# Patient Record
Sex: MALE | Race: BLACK OR AFRICAN AMERICAN | Employment: FULL TIME | URBAN - METROPOLITAN AREA
[De-identification: names, ages, dates, MRNs, and addresses within clinical notes are randomized per-mention and may not be internally consistent; named-entity substitution may affect disease eponyms.]

---

## 2021-10-18 ENCOUNTER — OFFICE VISIT (OUTPATIENT)
Dept: SURGERY | Facility: CLINIC | Age: 31
End: 2021-10-18
Payer: COMMERCIAL

## 2021-10-18 DIAGNOSIS — S01.302S OPEN WOUND OF LEFT EAR, UNSPECIFIED OPEN WOUND TYPE, SEQUELA: ICD-10-CM

## 2021-10-18 DIAGNOSIS — L91.0 KELOID: Primary | ICD-10-CM

## 2021-10-18 DIAGNOSIS — S01.301S OPEN WOUND OF RIGHT EAR, UNSPECIFIED OPEN WOUND TYPE, SEQUELA: ICD-10-CM

## 2021-10-18 DIAGNOSIS — S01.301S OPEN WOUND OF RIGHT EAR, UNSPECIFIED OPEN WOUND TYPE, SEQUELA: Primary | ICD-10-CM

## 2021-10-18 PROCEDURE — 99204 OFFICE O/P NEW MOD 45 MIN: CPT | Performed by: PLASTIC SURGERY

## 2021-10-18 RX ORDER — HYDROCODONE BITARTRATE AND ACETAMINOPHEN 7.5; 325 MG/1; MG/1
TABLET ORAL
Qty: 20 TABLET | Refills: 0 | Status: SHIPPED | OUTPATIENT
Start: 2021-10-18 | End: 2021-11-23

## 2021-10-18 NOTE — H&P (VIEW-ONLY)
Kari Boyer is a 28year old male that presents with Patient presents with:  Scar: Bilateral ear keloids  . REFERRED BY:  No ref.  provider found    Pacemaker: No  Latex Allergy: no  Coumadin: No  Plavix: No  Other anticoagulants: No  Cardiac stents: appearance - Normal  HEENT: Normocephalic  EYES: Conjunctiva - Right: Normal, Left: Normal; EOMI  EARS: Inspection - Right: Normal, Left: Normal  NECK/THYROID: Inspection - Normal, Palpation - Normal, Thyroid gland - Normal, No adenopathy  RESPIRATORY: Ins morbidity)    o    Major surgery with risk factors                  MDM LEVEL    MODERATE

## 2021-10-18 NOTE — PROGRESS NOTES
Patient request for surgery signed by patient and witnessed and signed by RN. Prescription for Norco  and narcotic prescription electronically sent to pharmacy per Dr. Vazquez Double order and patient instructed to  prescription before surgery.    Pre

## 2021-10-18 NOTE — H&P
Monica Jade is a 28year old male that presents with Patient presents with:  Scar: Bilateral ear keloids  . REFERRED BY:  No ref.  provider found    Pacemaker: No  Latex Allergy: no  Coumadin: No  Plavix: No  Other anticoagulants: No  Cardiac stents: appearance - Normal  HEENT: Normocephalic  EYES: Conjunctiva - Right: Normal, Left: Normal; EOMI  EARS: Inspection - Right: Normal, Left: Normal  NECK/THYROID: Inspection - Normal, Palpation - Normal, Thyroid gland - Normal, No adenopathy  RESPIRATORY: Ins morbidity)    o    Major surgery with risk factors                  MDM LEVEL    MODERATE

## 2021-10-19 ENCOUNTER — TELEPHONE (OUTPATIENT)
Dept: RADIATION ONCOLOGY | Facility: HOSPITAL | Age: 31
End: 2021-10-19

## 2021-10-21 ENCOUNTER — TELEPHONE (OUTPATIENT)
Dept: RADIATION ONCOLOGY | Facility: HOSPITAL | Age: 31
End: 2021-10-21

## 2021-10-28 DIAGNOSIS — L91.0 KELOID: Primary | ICD-10-CM

## 2021-10-28 DIAGNOSIS — S01.301S OPEN WOUND OF RIGHT EAR, UNSPECIFIED OPEN WOUND TYPE, SEQUELA: ICD-10-CM

## 2021-10-28 DIAGNOSIS — S01.302S OPEN WOUND OF LEFT EAR, UNSPECIFIED OPEN WOUND TYPE, SEQUELA: ICD-10-CM

## 2021-11-02 ENCOUNTER — OFFICE VISIT (OUTPATIENT)
Dept: RADIATION ONCOLOGY | Facility: HOSPITAL | Age: 31
End: 2021-11-02
Attending: RADIOLOGY
Payer: COMMERCIAL

## 2021-11-02 VITALS
OXYGEN SATURATION: 94 % | RESPIRATION RATE: 18 BRPM | WEIGHT: 152 LBS | DIASTOLIC BLOOD PRESSURE: 66 MMHG | SYSTOLIC BLOOD PRESSURE: 135 MMHG | HEART RATE: 56 BPM | TEMPERATURE: 98 F

## 2021-11-02 PROCEDURE — 99212 OFFICE O/P EST SF 10 MIN: CPT

## 2021-11-02 NOTE — PATIENT INSTRUCTIONS
You will get a call to schedule your CT simulation \"planning scan\" on 11/9/2021 following surgery. Call [phone] for any questions or concerns.

## 2021-11-02 NOTE — CONSULTS
RADIATION ONCOLOGY NOTE    DATE OF VISIT: 11/2/2021    DIAGNOSIS :  Symptomatic bilateral ear keloids, for excision shortly,     Dear Torin Rainey and colleagues,    Per our conversation, Mr. 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     HEENT:  PERRLA, EOMI,     Bilateral ear keloids, photos taken,     Right 3 x 3 x 2.5 R anterior ear, 5 x 3 x 3 R posterior ear  Left- 1cm x 1.5 cm posterior 1-1/2 cm     NECK:  Supp

## 2021-11-02 NOTE — PROGRESS NOTES
Nursing Consultation Note  Patient: Washington Bearden  YOB: 1990  Age: 28year old  Radiation Oncologist: Dr. Sonu Morgan  Referring Physician: Michelle Reed@[employer]  Consult Date: 11/2/2021      Chemotherapy: No  Labs: education level: Not on file    Occupational History      Not on file    Tobacco Use      Smoking status: Never Smoker      Smokeless tobacco: Not on file    Substance and Sexual Activity      Alcohol use: Never      Drug use: Not on file      Sexual activ Last Year: Not on file      Number of Places Lived in the Last Year: Not on file      Unstable Housing in the Last Year: Not on file    ECOG:  Grade 0 - Fully active, able to carry on all predisease activities without restrictions.       Education:  Yes are Dr. Graciela Lennon, Dr. Seferino Sherwood and Dr. Cory Tyson. VSS. PMH, allergies, and current medications reviewed with patient. Pt rates his pain a 0/10 today. Norco prescribed, pt hasn't picked up from pharmacy yet.  Pt scheduled for surgery on 11/9 for keloid removal. Pt

## 2021-11-09 ENCOUNTER — HOSPITAL DOCUMENTATION (OUTPATIENT)
Dept: SURGERY | Facility: CLINIC | Age: 31
End: 2021-11-09

## 2021-11-09 ENCOUNTER — ANESTHESIA EVENT (OUTPATIENT)
Dept: SURGERY | Facility: HOSPITAL | Age: 31
End: 2021-11-09
Payer: COMMERCIAL

## 2021-11-09 ENCOUNTER — ANESTHESIA (OUTPATIENT)
Dept: SURGERY | Facility: HOSPITAL | Age: 31
End: 2021-11-09
Payer: COMMERCIAL

## 2021-11-09 ENCOUNTER — HOSPITAL ENCOUNTER (OUTPATIENT)
Facility: HOSPITAL | Age: 31
Setting detail: HOSPITAL OUTPATIENT SURGERY
Discharge: HOME | End: 2021-11-09
Attending: PLASTIC SURGERY | Admitting: PLASTIC SURGERY
Payer: COMMERCIAL

## 2021-11-09 ENCOUNTER — APPOINTMENT (OUTPATIENT)
Dept: RADIATION ONCOLOGY | Facility: HOSPITAL | Age: 31
End: 2021-11-09
Attending: RADIOLOGY
Payer: COMMERCIAL

## 2021-11-09 VITALS
HEART RATE: 70 BPM | DIASTOLIC BLOOD PRESSURE: 55 MMHG | OXYGEN SATURATION: 97 % | BODY MASS INDEX: 26.58 KG/M2 | SYSTOLIC BLOOD PRESSURE: 120 MMHG | TEMPERATURE: 98 F | RESPIRATION RATE: 16 BRPM | WEIGHT: 150 LBS | HEIGHT: 63 IN

## 2021-11-09 DIAGNOSIS — L91.0 KELOID: Primary | ICD-10-CM

## 2021-11-09 DIAGNOSIS — S01.301S OPEN WOUND OF RIGHT EAR, UNSPECIFIED OPEN WOUND TYPE, SEQUELA: ICD-10-CM

## 2021-11-09 DIAGNOSIS — Z01.818 PRE-OP TESTING: Primary | ICD-10-CM

## 2021-11-09 DIAGNOSIS — S01.302S OPEN WOUND OF LEFT EAR, UNSPECIFIED OPEN WOUND TYPE, SEQUELA: ICD-10-CM

## 2021-11-09 PROCEDURE — 14060 TIS TRNFR E/N/E/L 10 SQ CM/<: CPT | Performed by: PLASTIC SURGERY

## 2021-11-09 PROCEDURE — 3E00X3Z INTRODUCTION OF ANTI-INFLAMMATORY INTO SKIN AND MUCOUS MEMBRANES, EXTERNAL APPROACH: ICD-10-PCS | Performed by: PLASTIC SURGERY

## 2021-11-09 PROCEDURE — 77470 SPECIAL RADIATION TREATMENT: CPT | Performed by: RADIOLOGY

## 2021-11-09 PROCEDURE — 0HX3XZZ TRANSFER LEFT EAR SKIN, EXTERNAL APPROACH: ICD-10-PCS | Performed by: PLASTIC SURGERY

## 2021-11-09 PROCEDURE — 77290 THER RAD SIMULAJ FIELD CPLX: CPT | Performed by: RADIOLOGY

## 2021-11-09 PROCEDURE — 0HX2XZZ TRANSFER RIGHT EAR SKIN, EXTERNAL APPROACH: ICD-10-PCS | Performed by: PLASTIC SURGERY

## 2021-11-09 RX ORDER — HYDROCODONE BITARTRATE AND ACETAMINOPHEN 5; 325 MG/1; MG/1
1 TABLET ORAL AS NEEDED
Status: DISCONTINUED | OUTPATIENT
Start: 2021-11-09 | End: 2021-11-09

## 2021-11-09 RX ORDER — ACETAMINOPHEN 500 MG
1000 TABLET ORAL ONCE
Status: COMPLETED | OUTPATIENT
Start: 2021-11-09 | End: 2021-11-09

## 2021-11-09 RX ORDER — HYDROMORPHONE HYDROCHLORIDE 1 MG/ML
0.2 INJECTION, SOLUTION INTRAMUSCULAR; INTRAVENOUS; SUBCUTANEOUS EVERY 5 MIN PRN
Status: DISCONTINUED | OUTPATIENT
Start: 2021-11-09 | End: 2021-11-09

## 2021-11-09 RX ORDER — LIDOCAINE HYDROCHLORIDE 10 MG/ML
INJECTION, SOLUTION EPIDURAL; INFILTRATION; INTRACAUDAL; PERINEURAL AS NEEDED
Status: DISCONTINUED | OUTPATIENT
Start: 2021-11-09 | End: 2021-11-09 | Stop reason: SURG

## 2021-11-09 RX ORDER — HYDROCODONE BITARTRATE AND ACETAMINOPHEN 5; 325 MG/1; MG/1
2 TABLET ORAL AS NEEDED
Status: DISCONTINUED | OUTPATIENT
Start: 2021-11-09 | End: 2021-11-09

## 2021-11-09 RX ORDER — GLYCOPYRROLATE 0.2 MG/ML
INJECTION, SOLUTION INTRAMUSCULAR; INTRAVENOUS AS NEEDED
Status: DISCONTINUED | OUTPATIENT
Start: 2021-11-09 | End: 2021-11-09 | Stop reason: SURG

## 2021-11-09 RX ORDER — CEFAZOLIN SODIUM/WATER 2 G/20 ML
2 SYRINGE (ML) INTRAVENOUS ONCE
Status: COMPLETED | OUTPATIENT
Start: 2021-11-09 | End: 2021-11-09

## 2021-11-09 RX ORDER — ONDANSETRON 2 MG/ML
4 INJECTION INTRAMUSCULAR; INTRAVENOUS ONCE AS NEEDED
Status: DISCONTINUED | OUTPATIENT
Start: 2021-11-09 | End: 2021-11-09

## 2021-11-09 RX ORDER — MORPHINE SULFATE 4 MG/ML
4 INJECTION, SOLUTION INTRAMUSCULAR; INTRAVENOUS EVERY 10 MIN PRN
Status: DISCONTINUED | OUTPATIENT
Start: 2021-11-09 | End: 2021-11-09

## 2021-11-09 RX ORDER — HALOPERIDOL 5 MG/ML
0.25 INJECTION INTRAMUSCULAR ONCE AS NEEDED
Status: DISCONTINUED | OUTPATIENT
Start: 2021-11-09 | End: 2021-11-09

## 2021-11-09 RX ORDER — MORPHINE SULFATE 10 MG/ML
6 INJECTION, SOLUTION INTRAMUSCULAR; INTRAVENOUS EVERY 10 MIN PRN
Status: DISCONTINUED | OUTPATIENT
Start: 2021-11-09 | End: 2021-11-09

## 2021-11-09 RX ORDER — ROCURONIUM BROMIDE 10 MG/ML
INJECTION, SOLUTION INTRAVENOUS AS NEEDED
Status: DISCONTINUED | OUTPATIENT
Start: 2021-11-09 | End: 2021-11-09 | Stop reason: SURG

## 2021-11-09 RX ORDER — SODIUM CHLORIDE, SODIUM LACTATE, POTASSIUM CHLORIDE, CALCIUM CHLORIDE 600; 310; 30; 20 MG/100ML; MG/100ML; MG/100ML; MG/100ML
INJECTION, SOLUTION INTRAVENOUS CONTINUOUS
Status: DISCONTINUED | OUTPATIENT
Start: 2021-11-09 | End: 2021-11-09

## 2021-11-09 RX ORDER — MORPHINE SULFATE 4 MG/ML
2 INJECTION, SOLUTION INTRAMUSCULAR; INTRAVENOUS EVERY 10 MIN PRN
Status: DISCONTINUED | OUTPATIENT
Start: 2021-11-09 | End: 2021-11-09

## 2021-11-09 RX ORDER — NEOSTIGMINE METHYLSULFATE 1 MG/ML
INJECTION INTRAVENOUS AS NEEDED
Status: DISCONTINUED | OUTPATIENT
Start: 2021-11-09 | End: 2021-11-09 | Stop reason: SURG

## 2021-11-09 RX ORDER — TRIAMCINOLONE ACETONIDE 40 MG/ML
INJECTION, SUSPENSION INTRA-ARTICULAR; INTRAMUSCULAR AS NEEDED
Status: DISCONTINUED | OUTPATIENT
Start: 2021-11-09 | End: 2021-11-09 | Stop reason: HOSPADM

## 2021-11-09 RX ORDER — MIDAZOLAM HYDROCHLORIDE 1 MG/ML
INJECTION INTRAMUSCULAR; INTRAVENOUS AS NEEDED
Status: DISCONTINUED | OUTPATIENT
Start: 2021-11-09 | End: 2021-11-09 | Stop reason: SURG

## 2021-11-09 RX ORDER — ONDANSETRON 2 MG/ML
INJECTION INTRAMUSCULAR; INTRAVENOUS AS NEEDED
Status: DISCONTINUED | OUTPATIENT
Start: 2021-11-09 | End: 2021-11-09 | Stop reason: SURG

## 2021-11-09 RX ORDER — NALOXONE HYDROCHLORIDE 0.4 MG/ML
80 INJECTION, SOLUTION INTRAMUSCULAR; INTRAVENOUS; SUBCUTANEOUS AS NEEDED
Status: DISCONTINUED | OUTPATIENT
Start: 2021-11-09 | End: 2021-11-09

## 2021-11-09 RX ORDER — KETOROLAC TROMETHAMINE 30 MG/ML
INJECTION, SOLUTION INTRAMUSCULAR; INTRAVENOUS AS NEEDED
Status: DISCONTINUED | OUTPATIENT
Start: 2021-11-09 | End: 2021-11-09 | Stop reason: SURG

## 2021-11-09 RX ORDER — HYDROMORPHONE HYDROCHLORIDE 1 MG/ML
0.4 INJECTION, SOLUTION INTRAMUSCULAR; INTRAVENOUS; SUBCUTANEOUS EVERY 5 MIN PRN
Status: DISCONTINUED | OUTPATIENT
Start: 2021-11-09 | End: 2021-11-09

## 2021-11-09 RX ORDER — DEXAMETHASONE SODIUM PHOSPHATE 4 MG/ML
VIAL (ML) INJECTION AS NEEDED
Status: DISCONTINUED | OUTPATIENT
Start: 2021-11-09 | End: 2021-11-09 | Stop reason: SURG

## 2021-11-09 RX ORDER — HYDROCODONE BITARTRATE AND ACETAMINOPHEN 7.5; 325 MG/1; MG/1
1 TABLET ORAL EVERY 4 HOURS PRN
Status: DISCONTINUED | OUTPATIENT
Start: 2021-11-09 | End: 2021-11-09

## 2021-11-09 RX ORDER — HYDROMORPHONE HYDROCHLORIDE 1 MG/ML
0.6 INJECTION, SOLUTION INTRAMUSCULAR; INTRAVENOUS; SUBCUTANEOUS EVERY 5 MIN PRN
Status: DISCONTINUED | OUTPATIENT
Start: 2021-11-09 | End: 2021-11-09

## 2021-11-09 RX ORDER — PROCHLORPERAZINE EDISYLATE 5 MG/ML
5 INJECTION INTRAMUSCULAR; INTRAVENOUS ONCE AS NEEDED
Status: DISCONTINUED | OUTPATIENT
Start: 2021-11-09 | End: 2021-11-09

## 2021-11-09 RX ORDER — MINERAL OIL AND PETROLATUM 150; 830 MG/G; MG/G
OINTMENT OPHTHALMIC AS NEEDED
Status: DISCONTINUED | OUTPATIENT
Start: 2021-11-09 | End: 2021-11-09 | Stop reason: SURG

## 2021-11-09 RX ADMIN — GLYCOPYRROLATE 0.1 MG: 0.2 INJECTION, SOLUTION INTRAMUSCULAR; INTRAVENOUS at 07:20:00

## 2021-11-09 RX ADMIN — SODIUM CHLORIDE, SODIUM LACTATE, POTASSIUM CHLORIDE, CALCIUM CHLORIDE: 600; 310; 30; 20 INJECTION, SOLUTION INTRAVENOUS at 09:54:00

## 2021-11-09 RX ADMIN — ONDANSETRON 4 MG: 2 INJECTION INTRAMUSCULAR; INTRAVENOUS at 09:49:00

## 2021-11-09 RX ADMIN — DEXAMETHASONE SODIUM PHOSPHATE 4 MG: 4 MG/ML VIAL (ML) INJECTION at 07:44:00

## 2021-11-09 RX ADMIN — GLYCOPYRROLATE 0.6 MG: 0.2 INJECTION, SOLUTION INTRAMUSCULAR; INTRAVENOUS at 09:38:00

## 2021-11-09 RX ADMIN — ROCURONIUM BROMIDE 35 MG: 10 INJECTION, SOLUTION INTRAVENOUS at 07:29:00

## 2021-11-09 RX ADMIN — ROCURONIUM BROMIDE 10 MG: 10 INJECTION, SOLUTION INTRAVENOUS at 08:15:00

## 2021-11-09 RX ADMIN — LIDOCAINE HYDROCHLORIDE 50 MG: 10 INJECTION, SOLUTION EPIDURAL; INFILTRATION; INTRACAUDAL; PERINEURAL at 07:27:00

## 2021-11-09 RX ADMIN — CEFAZOLIN SODIUM/WATER 2 G: 2 G/20 ML SYRINGE (ML) INTRAVENOUS at 07:24:00

## 2021-11-09 RX ADMIN — SODIUM CHLORIDE, SODIUM LACTATE, POTASSIUM CHLORIDE, CALCIUM CHLORIDE: 600; 310; 30; 20 INJECTION, SOLUTION INTRAVENOUS at 07:20:00

## 2021-11-09 RX ADMIN — ROCURONIUM BROMIDE 5 MG: 10 INJECTION, SOLUTION INTRAVENOUS at 08:35:00

## 2021-11-09 RX ADMIN — KETOROLAC TROMETHAMINE 30 MG: 30 INJECTION, SOLUTION INTRAMUSCULAR; INTRAVENOUS at 09:58:00

## 2021-11-09 RX ADMIN — ROCURONIUM BROMIDE 5 MG: 10 INJECTION, SOLUTION INTRAVENOUS at 09:05:00

## 2021-11-09 RX ADMIN — MINERAL OIL AND PETROLATUM 1 APPLICATION: 150; 830 OINTMENT OPHTHALMIC at 07:32:00

## 2021-11-09 RX ADMIN — NEOSTIGMINE METHYLSULFATE 3.5 MG: 1 INJECTION INTRAVENOUS at 09:38:00

## 2021-11-09 RX ADMIN — MIDAZOLAM HYDROCHLORIDE 2 MG: 1 INJECTION INTRAMUSCULAR; INTRAVENOUS at 07:20:00

## 2021-11-09 NOTE — ANESTHESIA PROCEDURE NOTES
Airway  Date/Time: 11/9/2021 7:30 AM  Urgency: Elective      General Information and Staff    Patient location during procedure: OR  Anesthesiologist: Jos Teran MD  Resident/CRNA: Ema Solares CRNA  Performed: CRNA     Indications and Nicole Dyer

## 2021-11-09 NOTE — ANESTHESIA POSTPROCEDURE EVALUATION
Patient: Carlos A Hadley    Procedure Summary     Date: 11/09/21 Room / Location: [facility] OR 01 / [facility] OR    Anesthesia Start: 0719 Anesthesia Stop: 3963    Procedure: EXCISION BILATERAL EAR KELOIDS WITH SOFT TISSUE RECONSTRUCTION, KENALOG 40 (B

## 2021-11-09 NOTE — BRIEF OP NOTE
Pre-Operative Diagnosis: Open wound of right ear, unspecified open wound type, sequela [S01.301S]  Open wound of left ear, unspecified open wound type, sequela [S01.302S]     Post-Operative Diagnosis: Open wound of right ear, unspecified open wound type, s

## 2021-11-09 NOTE — ANESTHESIA PREPROCEDURE EVALUATION
Anesthesia PreOp Note    HPI:     Heather Darnell is a 28year old male who presents for preoperative consultation requested by: Carlos Clark MD    Date of Surgery: 11/9/2021    Procedure(s):  EXCISION BILATERAL EAR KELOIDS WITH SOFT TISSUE Not on file    Other Topics      Concerns:        Left Handed: Not Asked        Right Handed: Not Asked        Currently spends a great deal of time in the sun: Not Asked        Past Sunlamp Treatments for Acne: Not Asked        History of tanning: Not Ask is 68 kg (150 lb). His oral temperature is 98 °F (36.7 °C). His blood pressure is 125/81 and his pulse is 62. His respiration is 20 and oxygen saturation is 98%.     11/06/21  0920 11/09/21  0604   BP:  125/81   Pulse:  62   Resp:  20   Temp:  98 °F (36.7 °

## 2021-11-09 NOTE — OPERATIVE REPORT
[facility]    PATIENT'S NAME: Amalia FITZGERALD   ATTENDING PHYSICIAN: Heather Terry MD   OPERATING PHYSICIAN: Heather Terry MD   PATIENT ACCOUNT#:   [id number]    LOCATION:  [place] [address]  MEDICAL RECORD #:    was achieved with gentle pressure. The dermis was injected with Kenalog 40 diluted 3-to-1, 2.3 mL total, 23 mg.     To effect closure without distorting the ear lobule, we incised posteriorly and elevated a flap which rotated inferiorly to fill in the so

## 2021-11-10 ENCOUNTER — APPOINTMENT (OUTPATIENT)
Dept: RADIATION ONCOLOGY | Facility: HOSPITAL | Age: 31
End: 2021-11-10
Attending: RADIOLOGY
Payer: COMMERCIAL

## 2021-11-10 ENCOUNTER — TELEPHONE (OUTPATIENT)
Dept: SURGERY | Facility: CLINIC | Age: 31
End: 2021-11-10

## 2021-11-10 PROCEDURE — 77290 THER RAD SIMULAJ FIELD CPLX: CPT | Performed by: RADIOLOGY

## 2021-11-10 PROCEDURE — 77412 RADIATION TX DELIVERY LVL 3: CPT | Performed by: RADIOLOGY

## 2021-11-10 PROCEDURE — 77332 RADIATION TREATMENT AID(S): CPT | Performed by: RADIOLOGY

## 2021-11-10 PROCEDURE — 77334 RADIATION TREATMENT AID(S): CPT | Performed by: RADIOLOGY

## 2021-11-10 PROCEDURE — 77321 SPECIAL TELETX PORT PLAN: CPT | Performed by: RADIOLOGY

## 2021-11-10 NOTE — TELEPHONE ENCOUNTER
Left message for post-operative patient to please call the office with any questions and/or concerns. Reminded patient of next RN appointment on 11/23. Dr. Ronaldo Kumar notified.

## 2021-11-12 ENCOUNTER — TELEPHONE (OUTPATIENT)
Dept: RADIATION ONCOLOGY | Facility: HOSPITAL | Age: 31
End: 2021-11-12

## 2021-11-12 ENCOUNTER — OFFICE VISIT (OUTPATIENT)
Dept: RADIATION ONCOLOGY | Facility: HOSPITAL | Age: 31
End: 2021-11-12
Attending: RADIOLOGY
Payer: COMMERCIAL

## 2021-11-12 VITALS
DIASTOLIC BLOOD PRESSURE: 67 MMHG | TEMPERATURE: 98 F | WEIGHT: 149.81 LBS | OXYGEN SATURATION: 98 % | RESPIRATION RATE: 16 BRPM | SYSTOLIC BLOOD PRESSURE: 132 MMHG | HEART RATE: 57 BPM | BODY MASS INDEX: 27 KG/M2

## 2021-11-12 PROCEDURE — 77412 RADIATION TX DELIVERY LVL 3: CPT | Performed by: RADIOLOGY

## 2021-11-12 NOTE — TELEPHONE ENCOUNTER
Patient calling and has appointment today @ 215,, And wanted to know if he could get in any earlier, as his Mother is in the Hospital.    Please advise

## 2021-11-12 NOTE — PROGRESS NOTES
[facility] Radiation Treatment Management Note 1-5    Patient:  Alfredo Barton  Age:  28year old  Visit Diagnosis:  No diagnosis found.   Primary Rad/Onc:  Dr. Isabel Mcneill    Site Delivered Dose (cGy) Prescribed Dose (cGy) Clemetine Pen

## 2021-11-16 ENCOUNTER — TELEPHONE (OUTPATIENT)
Dept: RADIATION ONCOLOGY | Facility: HOSPITAL | Age: 31
End: 2021-11-16

## 2021-11-16 ENCOUNTER — TELEPHONE (OUTPATIENT)
Dept: SURGERY | Facility: CLINIC | Age: 31
End: 2021-11-16

## 2021-11-16 NOTE — TELEPHONE ENCOUNTER
LVM in regards to RT. Pt put on schedule for treatment today d/t missing yesterday. RN number provided to patient in VM.

## 2021-11-17 ENCOUNTER — TELEPHONE (OUTPATIENT)
Dept: RADIATION ONCOLOGY | Facility: HOSPITAL | Age: 31
End: 2021-11-17

## 2021-11-17 NOTE — TELEPHONE ENCOUNTER
LVM regarding last radiation treatment. Per Dr. Adrián Coombs request, we are going to cancel last RT treatment. RN number left for patient to call back.

## 2021-11-17 NOTE — PROGRESS NOTES
RADIATION ONCOLOGY COMPLETION SUMMARY NOTE    DIAGNOSIS :  Symptomatic bilateral ear keloids, s/p excision and adjuvant XRT     Dear Torin Regan and colleagues,    As you recall, Mr. 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

## 2021-11-23 ENCOUNTER — TELEPHONE (OUTPATIENT)
Dept: ORTHOPEDICS CLINIC | Facility: CLINIC | Age: 31
End: 2021-11-23

## 2021-11-23 ENCOUNTER — NURSE ONLY (OUTPATIENT)
Dept: SURGERY | Facility: CLINIC | Age: 31
End: 2021-11-23
Payer: COMMERCIAL

## 2021-11-23 DIAGNOSIS — S01.302S OPEN WOUND OF LEFT EAR, UNSPECIFIED OPEN WOUND TYPE, SEQUELA: ICD-10-CM

## 2021-11-23 DIAGNOSIS — S01.301S OPEN WOUND OF RIGHT EAR, UNSPECIFIED OPEN WOUND TYPE, SEQUELA: ICD-10-CM

## 2021-11-23 DIAGNOSIS — Z48.02 ENCOUNTER FOR REMOVAL OF SUTURES: Primary | ICD-10-CM

## 2021-11-23 DIAGNOSIS — L91.0 KELOID: ICD-10-CM

## 2021-11-23 NOTE — TELEPHONE ENCOUNTER
Pt dropped of [facility] paperwork to be completed. Pt signed Alvester Facundo.

## 2021-11-23 NOTE — PROGRESS NOTES
Surgery 1: Bilateral ear keloids / Kenalog / post-op radiation  - Date: 11/09/21  - Days Since: 14  Patient here for suture to bilateral ear. Patient identified with 2 identifiers and orders verified. Patient presents w/steri-strip clean, dry, intact.   P

## 2021-11-26 ENCOUNTER — MED REC SCAN ONLY (OUTPATIENT)
Dept: ADMINISTRATIVE | Age: 31
End: 2021-11-26

## 2021-11-26 NOTE — TELEPHONE ENCOUNTER
FMLA and HIPAA logged. FMLA missing pages/ck orig dropped at [facility].

## 2021-12-22 NOTE — TELEPHONE ENCOUNTER
Dr. Yanet Soria,     Please sign off on form: FMLA  -Highlight the patient and hit \"Chart\" button.   -In Chart Review, w/in the Encounter tab - click 1 time on the Telephone call encounter for 11/23/21 Scroll down the telephone encounter.  -Click \"scan on

## (undated) DEVICE — DRAPE SRG 50X36IN HD TRBN STRL

## (undated) DEVICE — SOL  .9 1000ML BTL

## (undated) DEVICE — HEX-LOCKING BLADE ELECTRODE: Brand: EDGE

## (undated) DEVICE — GOWN SURG AERO BLUE PERF LG

## (undated) DEVICE — APPLICATOR COTTON TIP 6\" 2/PK

## (undated) DEVICE — 6 ML SYRINGE LUER-LOCK TIP: Brand: MONOJECT

## (undated) DEVICE — GAMMEX® PI HYBRID SIZE 7, STERILE POWDER-FREE SURGICAL GLOVE, POLYISOPRENE AND NEOPRENE BLEND: Brand: GAMMEX

## (undated) DEVICE — SUTURE ETHILON 4-0 699G

## (undated) DEVICE — PROXIMATE RH ROTATING HEAD SKIN STAPLERS (35 WIDE) CONTAINS 35 STAINLESS STEEL STAPLES: Brand: PROXIMATE

## (undated) DEVICE — DRAPE SHEET LG

## (undated) DEVICE — SUTURE ETHILON 4-0 1667G

## (undated) DEVICE — OUTPATIENT: Brand: MEDLINE INDUSTRIES, INC.

## (undated) DEVICE — PEN 6\" SURGICAL MARKING PURPL

## (undated) DEVICE — 3M™ STERI-STRIP™ REINFORCED ADHESIVE SKIN CLOSURES, R1547, 1/2 IN X 4 IN (12 MM X 100 MM), 6 STRIPS/ENVELOPE: Brand: 3M™ STERI-STRIP™